# Patient Record
Sex: FEMALE | Race: OTHER | HISPANIC OR LATINO | URBAN - METROPOLITAN AREA
[De-identification: names, ages, dates, MRNs, and addresses within clinical notes are randomized per-mention and may not be internally consistent; named-entity substitution may affect disease eponyms.]

---

## 2019-01-03 VITALS
OXYGEN SATURATION: 95 % | RESPIRATION RATE: 16 BRPM | TEMPERATURE: 97 F | SYSTOLIC BLOOD PRESSURE: 150 MMHG | HEART RATE: 89 BPM | WEIGHT: 143.08 LBS | DIASTOLIC BLOOD PRESSURE: 74 MMHG | HEIGHT: 60 IN

## 2019-01-03 NOTE — H&P ADULT - ASSESSMENT
73 y.o English/Belarusian speaking Female former heavy smoker with PMHx of HTN, Hyperlipidemia, IDDM, Asthma, Anxiety/depression,  non-obstructive CAD s/p diagnostic cardiac cath @ [facility] on 07/13/15, which revealed 30% mLAD, LCx w/ mild luminal irregularities, 50% prox Ramus, 30% pRPDA, LVEF of 60%, medical management, who now presents to [facility] for recommended cardiac catheterization with possible intervention in the setting of the pts risk factors, CCS class III anginal symptoms, known CAD and abnormal stress test.    ASA 3, Mallampati 3    of note: the pts last home dose of ASA was yesterday and denies taking Plavix, so will load the pt with ASA 81mg, Plavix 600mg and start on NS @ 75cc/hour.  K, AST and ALT hemolyzed, so re-drawing K and LFTs.    Risks & benefits of procedure and alternative therapy have been explained to the patient including but not limited to: allergic reaction, bleeding w/possible need for blood transfusion, infection, renal and vascular compromise, limb damage, arrhythmia, stroke, vessel dissection/perforation, Myocardial infarction, emergent CABG. Informed consent obtained and in chart. 73 y.o English/Indonesian speaking Female former heavy smoker with PMHx of HTN, Hyperlipidemia, IDDM, Asthma, Anxiety/depression,  non-obstructive CAD s/p diagnostic cardiac cath @ [facility] on 07/13/15, which revealed 30% mLAD, LCx w/ mild luminal irregularities, 50% prox Ramus, 30% pRPDA, LVEF of 60%, medical management, who now presents to [facility] for recommended cardiac catheterization with possible intervention in the setting of the pts risk factors, CCS class III anginal symptoms, known CAD and abnormal stress test.    ASA 3, Mallampati 3    of note: the pts last home dose of ASA was this morning and denies taking Plavix, so will load the pt with Plavix 600mg and start on NS @ 75cc/hour.  K, AST and ALT hemolyzed, so re-drawing K and LFTs and sending to lab.    Risks & benefits of procedure and alternative therapy have been explained to the patient including but not limited to: allergic reaction, bleeding w/possible need for blood transfusion, infection, renal and vascular compromise, limb damage, arrhythmia, stroke, vessel dissection/perforation, Myocardial infarction, emergent CABG. Informed consent obtained and in chart.

## 2019-01-03 NOTE — H&P ADULT - NSHPLABSRESULTS_GEN_ALL_CORE
13.6   6.0   )-----------( 178      ( 07 Jan 2019 09:37 )             40.9       01-07    140  |  104  |  25<H>  ----------------------------<  271<H>  see note   |  24  |  0.75    Ca    10.2      07 Jan 2019 09:38    TPro  7.5  /  Alb  4.4  /  TBili  0.2  /  DBili  x   /  AST  see note  /  ALT  see note  /  AlkPhos  48  01-07      PT/INR - ( 07 Jan 2019 09:37 )   PT: 11.7 sec;   INR: 1.03          PTT - ( 07 Jan 2019 09:37 )  PTT:33.0 sec    CARDIAC MARKERS ( 07 Jan 2019 09:38 )  x     / x     / see note U/L / x     / 2.6 ng/mL        EKG: NSR 80bpm

## 2019-01-03 NOTE — H&P ADULT - HISTORY OF PRESENT ILLNESS
*** PT TO BRING IN ALL MEDS     73 y.o English/Andorran speaking Female former heavy smoker with PMHx of HTN, Hyperlipidemia, IDDM, Asthma, Anxiety/depression,  non-obstructive CAD s/p diagnostic cardiac cath @ [facility] on 07/13/15, which revealed 30% mLAD, LCx w/ mild luminal irregularities, 50% prox Ramus, 30% pRPDA, LVEF of 60%.  Pt was recommended for medical therapy.  She recently presented to her cardiologist Dr. Figueroa c/o progressively worsening dyspnea on exertion when walking 2 or more city blocks over the past few months, resolving within minutes of rest  He denies any CP, recent PND/orthopnea, LE edema, palpitations, dizziness, or syncope. Echo done on 12/07/18 revealed no segmental wall motion abnormalities, no valvular heart disease, LVEF of 65%.    Subsequently,  pt underwent a Nuclear Stress test on 12/07/18, which revealed mild anteroseptal and lateral wall ischemia, LVEF >75%.      In light of pt's risk factors, Known CAD, above CCS Anginal Class 3 Equivalent symptoms, and an abnormal Stress test, pt is now referred to [facility] for recommended Cardiac Cath with possible intervention if clinically indicated to r/o underlying CAD, 73 y.o English/Estonian speaking Female former heavy smoker with PMHx of HTN, Hyperlipidemia, IDDM, Asthma, Anxiety/depression,  non-obstructive CAD s/p diagnostic cardiac cath @ [facility] on 07/13/15, which revealed 30% mLAD, LCx w/ mild luminal irregularities, 50% prox Ramus, 30% pRPDA, LVEF of 60%.  Pt was recommended for medical therapy.  She recently presented to her cardiologist Dr. Figueroa c/o progressively worsening dyspnea on exertion when walking 2 or more city blocks over the past few months, resolving within minutes of rest  He denies any CP, recent PND/orthopnea, LE edema, palpitations, dizziness, or syncope. Echo done on 12/07/18 revealed no segmental wall motion abnormalities, no valvular heart disease, LVEF of 65%.    Subsequently,  pt underwent a Nuclear Stress test on 12/07/18, which revealed mild anteroseptal and lateral wall ischemia, LVEF >75%.      In light of pt's risk factors, Known CAD, above CCS Anginal Class 3 Equivalent symptoms, and an abnormal Stress test, pt is now referred to [facility] for recommended Cardiac Cath with possible intervention if clinically indicated to r/o underlying CAD,

## 2019-01-03 NOTE — H&P ADULT - PMH
Anxiety    Asthma    Coronary artery disease    Depression    Diabetes mellitus    Hyperlipidemia    Hypertension

## 2019-01-07 ENCOUNTER — INPATIENT (INPATIENT)
Facility: HOSPITAL | Age: 74
LOS: 0 days | Discharge: HOME HEALTH | DRG: 247 | End: 2019-01-08
Attending: INTERNAL MEDICINE | Admitting: INTERNAL MEDICINE
Payer: MEDICARE

## 2019-01-07 LAB
ALBUMIN SERPL ELPH-MCNC: 4.3 G/DL — SIGNIFICANT CHANGE UP (ref 3.3–5)
ALBUMIN SERPL ELPH-MCNC: 4.4 G/DL — SIGNIFICANT CHANGE UP (ref 3.3–5)
ALP SERPL-CCNC: 47 U/L — SIGNIFICANT CHANGE UP (ref 40–120)
ALP SERPL-CCNC: 48 U/L — SIGNIFICANT CHANGE UP (ref 40–120)
ALT FLD-CCNC: 27 U/L — SIGNIFICANT CHANGE UP (ref 10–45)
ALT FLD-CCNC: SIGNIFICANT CHANGE UP U/L (ref 10–45)
ANION GAP SERPL CALC-SCNC: 12 MMOL/L — SIGNIFICANT CHANGE UP (ref 5–17)
APTT BLD: 33 SEC — SIGNIFICANT CHANGE UP (ref 27.5–36.3)
AST SERPL-CCNC: 25 U/L — SIGNIFICANT CHANGE UP (ref 10–40)
AST SERPL-CCNC: SIGNIFICANT CHANGE UP U/L (ref 10–40)
BASOPHILS NFR BLD AUTO: 0.5 % — SIGNIFICANT CHANGE UP (ref 0–2)
BILIRUB DIRECT SERPL-MCNC: <0.2 MG/DL — SIGNIFICANT CHANGE UP (ref 0–0.2)
BILIRUB INDIRECT FLD-MCNC: >0 MG/DL — LOW (ref 0.2–1)
BILIRUB SERPL-MCNC: 0.2 MG/DL — SIGNIFICANT CHANGE UP (ref 0.2–1.2)
BILIRUB SERPL-MCNC: 0.2 MG/DL — SIGNIFICANT CHANGE UP (ref 0.2–1.2)
BUN SERPL-MCNC: 25 MG/DL — HIGH (ref 7–23)
CALCIUM SERPL-MCNC: 10.2 MG/DL — SIGNIFICANT CHANGE UP (ref 8.4–10.5)
CHLORIDE SERPL-SCNC: 104 MMOL/L — SIGNIFICANT CHANGE UP (ref 96–108)
CHOLEST SERPL-MCNC: 148 MG/DL — SIGNIFICANT CHANGE UP (ref 10–199)
CK MB CFR SERPL CALC: 2.6 NG/ML — SIGNIFICANT CHANGE UP (ref 0–6.7)
CK SERPL-CCNC: SIGNIFICANT CHANGE UP U/L (ref 25–170)
CO2 SERPL-SCNC: 24 MMOL/L — SIGNIFICANT CHANGE UP (ref 22–31)
CREAT SERPL-MCNC: 0.75 MG/DL — SIGNIFICANT CHANGE UP (ref 0.5–1.3)
EOSINOPHIL NFR BLD AUTO: 1.7 % — SIGNIFICANT CHANGE UP (ref 0–6)
GLUCOSE BLDC GLUCOMTR-MCNC: 114 MG/DL — HIGH (ref 70–99)
GLUCOSE BLDC GLUCOMTR-MCNC: 142 MG/DL — HIGH (ref 70–99)
GLUCOSE BLDC GLUCOMTR-MCNC: 76 MG/DL — SIGNIFICANT CHANGE UP (ref 70–99)
GLUCOSE SERPL-MCNC: 271 MG/DL — HIGH (ref 70–99)
HBA1C BLD-MCNC: 7.8 % — HIGH (ref 4–5.6)
HCT VFR BLD CALC: 40.9 % — SIGNIFICANT CHANGE UP (ref 34.5–45)
HDLC SERPL-MCNC: 48 MG/DL — LOW
HGB BLD-MCNC: 13.6 G/DL — SIGNIFICANT CHANGE UP (ref 11.5–15.5)
INR BLD: 1.03 — SIGNIFICANT CHANGE UP (ref 0.88–1.16)
LIPID PNL WITH DIRECT LDL SERPL: 73 MG/DL — SIGNIFICANT CHANGE UP
LYMPHOCYTES # BLD AUTO: 26.4 % — SIGNIFICANT CHANGE UP (ref 13–44)
MCHC RBC-ENTMCNC: 28.4 PG — SIGNIFICANT CHANGE UP (ref 27–34)
MCHC RBC-ENTMCNC: 33.3 G/DL — SIGNIFICANT CHANGE UP (ref 32–36)
MCV RBC AUTO: 85.4 FL — SIGNIFICANT CHANGE UP (ref 80–100)
MONOCYTES NFR BLD AUTO: 4.5 % — SIGNIFICANT CHANGE UP (ref 2–14)
NEUTROPHILS NFR BLD AUTO: 66.9 % — SIGNIFICANT CHANGE UP (ref 43–77)
PLATELET # BLD AUTO: 178 K/UL — SIGNIFICANT CHANGE UP (ref 150–400)
POTASSIUM SERPL-MCNC: 4.3 MMOL/L — SIGNIFICANT CHANGE UP (ref 3.5–5.3)
POTASSIUM SERPL-MCNC: SIGNIFICANT CHANGE UP MMOL/L (ref 3.5–5.3)
POTASSIUM SERPL-SCNC: 4.3 MMOL/L — SIGNIFICANT CHANGE UP (ref 3.5–5.3)
POTASSIUM SERPL-SCNC: SIGNIFICANT CHANGE UP MMOL/L (ref 3.5–5.3)
PROT SERPL-MCNC: 7 G/DL — SIGNIFICANT CHANGE UP (ref 6–8.3)
PROT SERPL-MCNC: 7.5 G/DL — SIGNIFICANT CHANGE UP (ref 6–8.3)
PROTHROM AB SERPL-ACNC: 11.7 SEC — SIGNIFICANT CHANGE UP (ref 10–12.9)
RBC # BLD: 4.79 M/UL — SIGNIFICANT CHANGE UP (ref 3.8–5.2)
RBC # FLD: 13.7 % — SIGNIFICANT CHANGE UP (ref 10.3–16.9)
SODIUM SERPL-SCNC: 140 MMOL/L — SIGNIFICANT CHANGE UP (ref 135–145)
TOTAL CHOLESTEROL/HDL RATIO MEASUREMENT: 3.1 RATIO — LOW (ref 3.3–7.1)
TRIGL SERPL-MCNC: 137 MG/DL — SIGNIFICANT CHANGE UP (ref 10–149)
WBC # BLD: 6 K/UL — SIGNIFICANT CHANGE UP (ref 3.8–10.5)
WBC # FLD AUTO: 6 K/UL — SIGNIFICANT CHANGE UP (ref 3.8–10.5)

## 2019-01-07 PROCEDURE — 99222 1ST HOSP IP/OBS MODERATE 55: CPT

## 2019-01-07 PROCEDURE — 92928 PRQ TCAT PLMT NTRAC ST 1 LES: CPT | Mod: LD

## 2019-01-07 PROCEDURE — 93010 ELECTROCARDIOGRAM REPORT: CPT | Mod: 76

## 2019-01-07 PROCEDURE — 93458 L HRT ARTERY/VENTRICLE ANGIO: CPT | Mod: 26,XU

## 2019-01-07 RX ORDER — INSULIN GLARGINE 100 [IU]/ML
28 INJECTION, SOLUTION SUBCUTANEOUS AT BEDTIME
Qty: 0 | Refills: 0 | Status: DISCONTINUED | OUTPATIENT
Start: 2019-01-07 | End: 2019-01-08

## 2019-01-07 RX ORDER — ARIPIPRAZOLE 15 MG/1
10 TABLET ORAL DAILY
Qty: 0 | Refills: 0 | Status: DISCONTINUED | OUTPATIENT
Start: 2019-01-07 | End: 2019-01-08

## 2019-01-07 RX ORDER — MECLIZINE HCL 12.5 MG
12.5 TABLET ORAL DAILY
Qty: 0 | Refills: 0 | Status: DISCONTINUED | OUTPATIENT
Start: 2019-01-07 | End: 2019-01-08

## 2019-01-07 RX ORDER — INSULIN LISPRO 100/ML
VIAL (ML) SUBCUTANEOUS
Qty: 0 | Refills: 0 | Status: DISCONTINUED | OUTPATIENT
Start: 2019-01-07 | End: 2019-01-08

## 2019-01-07 RX ORDER — SODIUM CHLORIDE 9 MG/ML
500 INJECTION INTRAMUSCULAR; INTRAVENOUS; SUBCUTANEOUS
Qty: 0 | Refills: 0 | Status: DISCONTINUED | OUTPATIENT
Start: 2019-01-07 | End: 2019-01-07

## 2019-01-07 RX ORDER — SODIUM CHLORIDE 9 MG/ML
1000 INJECTION, SOLUTION INTRAVENOUS
Qty: 0 | Refills: 0 | Status: DISCONTINUED | OUTPATIENT
Start: 2019-01-07 | End: 2019-01-07

## 2019-01-07 RX ORDER — ALBUTEROL 90 UG/1
2 AEROSOL, METERED ORAL EVERY 6 HOURS
Qty: 0 | Refills: 0 | Status: DISCONTINUED | OUTPATIENT
Start: 2019-01-07 | End: 2019-01-08

## 2019-01-07 RX ORDER — DEXTROSE 50 % IN WATER 50 %
15 SYRINGE (ML) INTRAVENOUS ONCE
Qty: 0 | Refills: 0 | Status: DISCONTINUED | OUTPATIENT
Start: 2019-01-07 | End: 2019-01-07

## 2019-01-07 RX ORDER — INSULIN LISPRO 100/ML
VIAL (ML) SUBCUTANEOUS ONCE
Qty: 0 | Refills: 0 | Status: DISCONTINUED | OUTPATIENT
Start: 2019-01-07 | End: 2019-01-07

## 2019-01-07 RX ORDER — DEXTROSE 50 % IN WATER 50 %
25 SYRINGE (ML) INTRAVENOUS ONCE
Qty: 0 | Refills: 0 | Status: DISCONTINUED | OUTPATIENT
Start: 2019-01-07 | End: 2019-01-07

## 2019-01-07 RX ORDER — SODIUM CHLORIDE 9 MG/ML
1000 INJECTION, SOLUTION INTRAVENOUS
Qty: 0 | Refills: 0 | Status: DISCONTINUED | OUTPATIENT
Start: 2019-01-07 | End: 2019-01-08

## 2019-01-07 RX ORDER — DEXTROSE 50 % IN WATER 50 %
12.5 SYRINGE (ML) INTRAVENOUS ONCE
Qty: 0 | Refills: 0 | Status: DISCONTINUED | OUTPATIENT
Start: 2019-01-07 | End: 2019-01-08

## 2019-01-07 RX ORDER — CLOPIDOGREL BISULFATE 75 MG/1
75 TABLET, FILM COATED ORAL DAILY
Qty: 0 | Refills: 0 | Status: DISCONTINUED | OUTPATIENT
Start: 2019-01-08 | End: 2019-01-08

## 2019-01-07 RX ORDER — SODIUM CHLORIDE 9 MG/ML
500 INJECTION INTRAMUSCULAR; INTRAVENOUS; SUBCUTANEOUS
Qty: 0 | Refills: 0 | Status: DISCONTINUED | OUTPATIENT
Start: 2019-01-07 | End: 2019-01-08

## 2019-01-07 RX ORDER — DEXTROSE 50 % IN WATER 50 %
25 SYRINGE (ML) INTRAVENOUS ONCE
Qty: 0 | Refills: 0 | Status: DISCONTINUED | OUTPATIENT
Start: 2019-01-07 | End: 2019-01-08

## 2019-01-07 RX ORDER — DEXTROSE 50 % IN WATER 50 %
15 SYRINGE (ML) INTRAVENOUS ONCE
Qty: 0 | Refills: 0 | Status: DISCONTINUED | OUTPATIENT
Start: 2019-01-07 | End: 2019-01-08

## 2019-01-07 RX ORDER — BUDESONIDE AND FORMOTEROL FUMARATE DIHYDRATE 160; 4.5 UG/1; UG/1
2 AEROSOL RESPIRATORY (INHALATION)
Qty: 0 | Refills: 0 | Status: DISCONTINUED | OUTPATIENT
Start: 2019-01-07 | End: 2019-01-08

## 2019-01-07 RX ORDER — GABAPENTIN 400 MG/1
300 CAPSULE ORAL THREE TIMES A DAY
Qty: 0 | Refills: 0 | Status: DISCONTINUED | OUTPATIENT
Start: 2019-01-07 | End: 2019-01-08

## 2019-01-07 RX ORDER — DEXTROSE 50 % IN WATER 50 %
12.5 SYRINGE (ML) INTRAVENOUS ONCE
Qty: 0 | Refills: 0 | Status: DISCONTINUED | OUTPATIENT
Start: 2019-01-07 | End: 2019-01-07

## 2019-01-07 RX ORDER — CHLORHEXIDINE GLUCONATE 213 G/1000ML
1 SOLUTION TOPICAL ONCE
Qty: 0 | Refills: 0 | Status: DISCONTINUED | OUTPATIENT
Start: 2019-01-07 | End: 2019-01-07

## 2019-01-07 RX ORDER — ATORVASTATIN CALCIUM 80 MG/1
80 TABLET, FILM COATED ORAL AT BEDTIME
Qty: 0 | Refills: 0 | Status: DISCONTINUED | OUTPATIENT
Start: 2019-01-07 | End: 2019-01-08

## 2019-01-07 RX ORDER — CLOPIDOGREL BISULFATE 75 MG/1
600 TABLET, FILM COATED ORAL ONCE
Qty: 0 | Refills: 0 | Status: COMPLETED | OUTPATIENT
Start: 2019-01-07 | End: 2019-01-07

## 2019-01-07 RX ORDER — GLUCAGON INJECTION, SOLUTION 0.5 MG/.1ML
1 INJECTION, SOLUTION SUBCUTANEOUS ONCE
Qty: 0 | Refills: 0 | Status: DISCONTINUED | OUTPATIENT
Start: 2019-01-07 | End: 2019-01-07

## 2019-01-07 RX ORDER — NIFEDIPINE 30 MG
30 TABLET, EXTENDED RELEASE 24 HR ORAL ONCE
Qty: 0 | Refills: 0 | Status: COMPLETED | OUTPATIENT
Start: 2019-01-07 | End: 2019-01-07

## 2019-01-07 RX ORDER — ASPIRIN/CALCIUM CARB/MAGNESIUM 324 MG
81 TABLET ORAL DAILY
Qty: 0 | Refills: 0 | Status: DISCONTINUED | OUTPATIENT
Start: 2019-01-08 | End: 2019-01-08

## 2019-01-07 RX ORDER — DULOXETINE HYDROCHLORIDE 30 MG/1
90 CAPSULE, DELAYED RELEASE ORAL DAILY
Qty: 0 | Refills: 0 | Status: DISCONTINUED | OUTPATIENT
Start: 2019-01-07 | End: 2019-01-08

## 2019-01-07 RX ORDER — MONTELUKAST 4 MG/1
10 TABLET, CHEWABLE ORAL DAILY
Qty: 0 | Refills: 0 | Status: DISCONTINUED | OUTPATIENT
Start: 2019-01-07 | End: 2019-01-08

## 2019-01-07 RX ORDER — GLUCAGON INJECTION, SOLUTION 0.5 MG/.1ML
1 INJECTION, SOLUTION SUBCUTANEOUS ONCE
Qty: 0 | Refills: 0 | Status: DISCONTINUED | OUTPATIENT
Start: 2019-01-07 | End: 2019-01-08

## 2019-01-07 RX ORDER — PANTOPRAZOLE SODIUM 20 MG/1
40 TABLET, DELAYED RELEASE ORAL
Qty: 0 | Refills: 0 | Status: DISCONTINUED | OUTPATIENT
Start: 2019-01-07 | End: 2019-01-08

## 2019-01-07 RX ORDER — FENOFIBRATE,MICRONIZED 130 MG
145 CAPSULE ORAL DAILY
Qty: 0 | Refills: 0 | Status: DISCONTINUED | OUTPATIENT
Start: 2019-01-07 | End: 2019-01-08

## 2019-01-07 RX ADMIN — Medication 30 MILLIGRAM(S): at 18:14

## 2019-01-07 RX ADMIN — ATORVASTATIN CALCIUM 80 MILLIGRAM(S): 80 TABLET, FILM COATED ORAL at 21:08

## 2019-01-07 RX ADMIN — Medication 12.5 MILLIGRAM(S): at 18:14

## 2019-01-07 RX ADMIN — GABAPENTIN 300 MILLIGRAM(S): 400 CAPSULE ORAL at 21:08

## 2019-01-07 RX ADMIN — ARIPIPRAZOLE 10 MILLIGRAM(S): 15 TABLET ORAL at 18:14

## 2019-01-07 RX ADMIN — SODIUM CHLORIDE 75 MILLILITER(S): 9 INJECTION INTRAMUSCULAR; INTRAVENOUS; SUBCUTANEOUS at 17:20

## 2019-01-07 RX ADMIN — INSULIN GLARGINE 28 UNIT(S): 100 INJECTION, SOLUTION SUBCUTANEOUS at 23:04

## 2019-01-07 RX ADMIN — DULOXETINE HYDROCHLORIDE 90 MILLIGRAM(S): 30 CAPSULE, DELAYED RELEASE ORAL at 18:14

## 2019-01-07 RX ADMIN — BUDESONIDE AND FORMOTEROL FUMARATE DIHYDRATE 2 PUFF(S): 160; 4.5 AEROSOL RESPIRATORY (INHALATION) at 18:14

## 2019-01-07 RX ADMIN — MONTELUKAST 10 MILLIGRAM(S): 4 TABLET, CHEWABLE ORAL at 18:14

## 2019-01-07 RX ADMIN — Medication 145 MILLIGRAM(S): at 18:14

## 2019-01-07 RX ADMIN — SODIUM CHLORIDE 75 MILLILITER(S): 9 INJECTION INTRAMUSCULAR; INTRAVENOUS; SUBCUTANEOUS at 10:41

## 2019-01-07 RX ADMIN — Medication 1 MILLIGRAM(S): at 18:14

## 2019-01-07 RX ADMIN — CLOPIDOGREL BISULFATE 600 MILLIGRAM(S): 75 TABLET, FILM COATED ORAL at 10:41

## 2019-01-08 ENCOUNTER — INBOUND DOCUMENT (OUTPATIENT)
Age: 74
End: 2019-01-08

## 2019-01-08 ENCOUNTER — TRANSCRIPTION ENCOUNTER (OUTPATIENT)
Age: 74
End: 2019-01-08

## 2019-01-08 VITALS
HEART RATE: 99 BPM | OXYGEN SATURATION: 96 % | DIASTOLIC BLOOD PRESSURE: 79 MMHG | RESPIRATION RATE: 16 BRPM | SYSTOLIC BLOOD PRESSURE: 153 MMHG

## 2019-01-08 LAB
ANION GAP SERPL CALC-SCNC: 16 MMOL/L — SIGNIFICANT CHANGE UP (ref 5–17)
BASOPHILS NFR BLD AUTO: 0.3 % — SIGNIFICANT CHANGE UP (ref 0–2)
BUN SERPL-MCNC: 22 MG/DL — SIGNIFICANT CHANGE UP (ref 7–23)
CALCIUM SERPL-MCNC: 9.9 MG/DL — SIGNIFICANT CHANGE UP (ref 8.4–10.5)
CHLORIDE SERPL-SCNC: 100 MMOL/L — SIGNIFICANT CHANGE UP (ref 96–108)
CO2 SERPL-SCNC: 25 MMOL/L — SIGNIFICANT CHANGE UP (ref 22–31)
CREAT SERPL-MCNC: 0.77 MG/DL — SIGNIFICANT CHANGE UP (ref 0.5–1.3)
EOSINOPHIL NFR BLD AUTO: 1.3 % — SIGNIFICANT CHANGE UP (ref 0–6)
GLUCOSE BLDC GLUCOMTR-MCNC: 148 MG/DL — HIGH (ref 70–99)
GLUCOSE BLDC GLUCOMTR-MCNC: 162 MG/DL — HIGH (ref 70–99)
GLUCOSE BLDC GLUCOMTR-MCNC: 239 MG/DL — HIGH (ref 70–99)
GLUCOSE SERPL-MCNC: 151 MG/DL — HIGH (ref 70–99)
HBA1C BLD-MCNC: 7.6 % — HIGH (ref 4–5.6)
HCT VFR BLD CALC: 40.6 % — SIGNIFICANT CHANGE UP (ref 34.5–45)
HGB BLD-MCNC: 13.2 G/DL — SIGNIFICANT CHANGE UP (ref 11.5–15.5)
LYMPHOCYTES # BLD AUTO: 23 % — SIGNIFICANT CHANGE UP (ref 13–44)
MAGNESIUM SERPL-MCNC: 2 MG/DL — SIGNIFICANT CHANGE UP (ref 1.6–2.6)
MCHC RBC-ENTMCNC: 28 PG — SIGNIFICANT CHANGE UP (ref 27–34)
MCHC RBC-ENTMCNC: 32.5 G/DL — SIGNIFICANT CHANGE UP (ref 32–36)
MCV RBC AUTO: 86 FL — SIGNIFICANT CHANGE UP (ref 80–100)
MONOCYTES NFR BLD AUTO: 8.8 % — SIGNIFICANT CHANGE UP (ref 2–14)
NEUTROPHILS NFR BLD AUTO: 66.6 % — SIGNIFICANT CHANGE UP (ref 43–77)
NT-PROBNP SERPL-SCNC: 74 PG/ML — SIGNIFICANT CHANGE UP (ref 0–300)
PLATELET # BLD AUTO: 182 K/UL — SIGNIFICANT CHANGE UP (ref 150–400)
POTASSIUM SERPL-MCNC: 4.1 MMOL/L — SIGNIFICANT CHANGE UP (ref 3.5–5.3)
POTASSIUM SERPL-SCNC: 4.1 MMOL/L — SIGNIFICANT CHANGE UP (ref 3.5–5.3)
RBC # BLD: 4.72 M/UL — SIGNIFICANT CHANGE UP (ref 3.8–5.2)
RBC # FLD: 13.9 % — SIGNIFICANT CHANGE UP (ref 10.3–16.9)
SODIUM SERPL-SCNC: 141 MMOL/L — SIGNIFICANT CHANGE UP (ref 135–145)
WBC # BLD: 6.9 K/UL — SIGNIFICANT CHANGE UP (ref 3.8–10.5)
WBC # FLD AUTO: 6.9 K/UL — SIGNIFICANT CHANGE UP (ref 3.8–10.5)

## 2019-01-08 PROCEDURE — 84132 ASSAY OF SERUM POTASSIUM: CPT

## 2019-01-08 PROCEDURE — 36415 COLL VENOUS BLD VENIPUNCTURE: CPT

## 2019-01-08 PROCEDURE — 80053 COMPREHEN METABOLIC PANEL: CPT

## 2019-01-08 PROCEDURE — 80048 BASIC METABOLIC PNL TOTAL CA: CPT

## 2019-01-08 PROCEDURE — 83880 ASSAY OF NATRIURETIC PEPTIDE: CPT

## 2019-01-08 PROCEDURE — 80061 LIPID PANEL: CPT

## 2019-01-08 PROCEDURE — 99238 HOSP IP/OBS DSCHRG MGMT 30/<: CPT

## 2019-01-08 PROCEDURE — 71045 X-RAY EXAM CHEST 1 VIEW: CPT

## 2019-01-08 PROCEDURE — 99231 SBSQ HOSP IP/OBS SF/LOW 25: CPT | Mod: 25

## 2019-01-08 PROCEDURE — 85610 PROTHROMBIN TIME: CPT

## 2019-01-08 PROCEDURE — C1874: CPT

## 2019-01-08 PROCEDURE — C1769: CPT

## 2019-01-08 PROCEDURE — 85025 COMPLETE CBC W/AUTO DIFF WBC: CPT

## 2019-01-08 PROCEDURE — 85730 THROMBOPLASTIN TIME PARTIAL: CPT

## 2019-01-08 PROCEDURE — 94640 AIRWAY INHALATION TREATMENT: CPT

## 2019-01-08 PROCEDURE — 82553 CREATINE MB FRACTION: CPT

## 2019-01-08 PROCEDURE — C1887: CPT

## 2019-01-08 PROCEDURE — 93005 ELECTROCARDIOGRAM TRACING: CPT

## 2019-01-08 PROCEDURE — 83735 ASSAY OF MAGNESIUM: CPT

## 2019-01-08 PROCEDURE — 82550 ASSAY OF CK (CPK): CPT

## 2019-01-08 PROCEDURE — C1725: CPT

## 2019-01-08 PROCEDURE — 71045 X-RAY EXAM CHEST 1 VIEW: CPT | Mod: 26

## 2019-01-08 PROCEDURE — 80076 HEPATIC FUNCTION PANEL: CPT

## 2019-01-08 PROCEDURE — C1894: CPT

## 2019-01-08 PROCEDURE — 82962 GLUCOSE BLOOD TEST: CPT

## 2019-01-08 PROCEDURE — 83036 HEMOGLOBIN GLYCOSYLATED A1C: CPT

## 2019-01-08 RX ORDER — GABAPENTIN 400 MG/1
1 CAPSULE ORAL
Qty: 0 | Refills: 0 | COMMUNITY

## 2019-01-08 RX ORDER — CARVEDILOL PHOSPHATE 80 MG/1
6.25 CAPSULE, EXTENDED RELEASE ORAL EVERY 12 HOURS
Qty: 0 | Refills: 0 | Status: DISCONTINUED | OUTPATIENT
Start: 2019-01-08 | End: 2019-01-08

## 2019-01-08 RX ORDER — MONTELUKAST 4 MG/1
1 TABLET, CHEWABLE ORAL
Qty: 0 | Refills: 0 | COMMUNITY

## 2019-01-08 RX ORDER — CARVEDILOL PHOSPHATE 80 MG/1
3.12 CAPSULE, EXTENDED RELEASE ORAL ONCE
Qty: 0 | Refills: 0 | Status: COMPLETED | OUTPATIENT
Start: 2019-01-08 | End: 2019-01-08

## 2019-01-08 RX ORDER — DULOXETINE HYDROCHLORIDE 30 MG/1
1 CAPSULE, DELAYED RELEASE ORAL
Qty: 0 | Refills: 0 | COMMUNITY

## 2019-01-08 RX ORDER — ASPIRIN/CALCIUM CARB/MAGNESIUM 324 MG
1 TABLET ORAL
Qty: 30 | Refills: 11 | OUTPATIENT
Start: 2019-01-08 | End: 2020-01-02

## 2019-01-08 RX ORDER — ALBUTEROL 90 UG/1
2 AEROSOL, METERED ORAL
Qty: 0 | Refills: 0 | COMMUNITY

## 2019-01-08 RX ORDER — CARVEDILOL PHOSPHATE 80 MG/1
1 CAPSULE, EXTENDED RELEASE ORAL
Qty: 60 | Refills: 0 | OUTPATIENT
Start: 2019-01-08 | End: 2019-02-06

## 2019-01-08 RX ORDER — ESOMEPRAZOLE MAGNESIUM 40 MG/1
1 CAPSULE, DELAYED RELEASE ORAL
Qty: 0 | Refills: 0 | COMMUNITY

## 2019-01-08 RX ORDER — CELECOXIB 200 MG/1
1 CAPSULE ORAL
Qty: 0 | Refills: 0 | COMMUNITY

## 2019-01-08 RX ORDER — FENOFIBRATE,MICRONIZED 130 MG
1 CAPSULE ORAL
Qty: 0 | Refills: 0 | COMMUNITY

## 2019-01-08 RX ORDER — ARIPIPRAZOLE 15 MG/1
1 TABLET ORAL
Qty: 0 | Refills: 0 | COMMUNITY

## 2019-01-08 RX ORDER — BUDESONIDE AND FORMOTEROL FUMARATE DIHYDRATE 160; 4.5 UG/1; UG/1
2 AEROSOL RESPIRATORY (INHALATION)
Qty: 0 | Refills: 0 | COMMUNITY

## 2019-01-08 RX ORDER — INSULIN DEGLUDEC 100 U/ML
36 INJECTION, SOLUTION SUBCUTANEOUS
Qty: 0 | Refills: 0 | COMMUNITY

## 2019-01-08 RX ORDER — LUBIPROSTONE 24 UG/1
1 CAPSULE, GELATIN COATED ORAL
Qty: 0 | Refills: 0 | COMMUNITY

## 2019-01-08 RX ORDER — MULTIVIT-MIN/FERROUS GLUCONATE 9 MG/15 ML
1 LIQUID (ML) ORAL
Qty: 0 | Refills: 0 | COMMUNITY

## 2019-01-08 RX ORDER — SITAGLIPTIN AND METFORMIN HYDROCHLORIDE 500; 50 MG/1; MG/1
2 TABLET, FILM COATED ORAL
Qty: 0 | Refills: 0 | COMMUNITY

## 2019-01-08 RX ORDER — INSULIN GLARGINE 100 [IU]/ML
28 INJECTION, SOLUTION SUBCUTANEOUS
Qty: 0 | Refills: 0 | COMMUNITY
Start: 2019-01-08

## 2019-01-08 RX ORDER — CARVEDILOL PHOSPHATE 80 MG/1
3.12 CAPSULE, EXTENDED RELEASE ORAL EVERY 12 HOURS
Qty: 0 | Refills: 0 | Status: DISCONTINUED | OUTPATIENT
Start: 2019-01-08 | End: 2019-01-08

## 2019-01-08 RX ORDER — ASPIRIN/CALCIUM CARB/MAGNESIUM 324 MG
1 TABLET ORAL
Qty: 0 | Refills: 0 | COMMUNITY

## 2019-01-08 RX ORDER — MECLIZINE HCL 12.5 MG
1 TABLET ORAL
Qty: 0 | Refills: 0 | COMMUNITY

## 2019-01-08 RX ORDER — LINACLOTIDE 145 UG/1
1 CAPSULE, GELATIN COATED ORAL
Qty: 0 | Refills: 0 | COMMUNITY

## 2019-01-08 RX ORDER — ROSUVASTATIN CALCIUM 5 MG/1
1 TABLET ORAL
Qty: 0 | Refills: 0 | COMMUNITY

## 2019-01-08 RX ORDER — CLOPIDOGREL BISULFATE 75 MG/1
1 TABLET, FILM COATED ORAL
Qty: 30 | Refills: 11 | OUTPATIENT
Start: 2019-01-08 | End: 2020-01-02

## 2019-01-08 RX ORDER — DULAGLUTIDE 4.5 MG/.5ML
0 INJECTION, SOLUTION SUBCUTANEOUS
Qty: 0 | Refills: 0 | COMMUNITY

## 2019-01-08 RX ADMIN — Medication 2: at 11:45

## 2019-01-08 RX ADMIN — PANTOPRAZOLE SODIUM 40 MILLIGRAM(S): 20 TABLET, DELAYED RELEASE ORAL at 05:48

## 2019-01-08 RX ADMIN — BUDESONIDE AND FORMOTEROL FUMARATE DIHYDRATE 2 PUFF(S): 160; 4.5 AEROSOL RESPIRATORY (INHALATION) at 06:00

## 2019-01-08 RX ADMIN — CARVEDILOL PHOSPHATE 3.12 MILLIGRAM(S): 80 CAPSULE, EXTENDED RELEASE ORAL at 15:59

## 2019-01-08 RX ADMIN — CARVEDILOL PHOSPHATE 3.12 MILLIGRAM(S): 80 CAPSULE, EXTENDED RELEASE ORAL at 13:45

## 2019-01-08 RX ADMIN — DULOXETINE HYDROCHLORIDE 90 MILLIGRAM(S): 30 CAPSULE, DELAYED RELEASE ORAL at 11:46

## 2019-01-08 RX ADMIN — Medication 81 MILLIGRAM(S): at 11:46

## 2019-01-08 RX ADMIN — GABAPENTIN 300 MILLIGRAM(S): 400 CAPSULE ORAL at 13:45

## 2019-01-08 RX ADMIN — Medication 1 MILLIGRAM(S): at 05:48

## 2019-01-08 RX ADMIN — CLOPIDOGREL BISULFATE 75 MILLIGRAM(S): 75 TABLET, FILM COATED ORAL at 11:46

## 2019-01-08 RX ADMIN — GABAPENTIN 300 MILLIGRAM(S): 400 CAPSULE ORAL at 05:47

## 2019-01-08 RX ADMIN — ARIPIPRAZOLE 10 MILLIGRAM(S): 15 TABLET ORAL at 11:46

## 2019-01-08 RX ADMIN — Medication 12.5 MILLIGRAM(S): at 11:46

## 2019-01-08 RX ADMIN — Medication 145 MILLIGRAM(S): at 11:46

## 2019-01-08 RX ADMIN — Medication 1: at 07:26

## 2019-01-08 RX ADMIN — MONTELUKAST 10 MILLIGRAM(S): 4 TABLET, CHEWABLE ORAL at 11:46

## 2019-01-08 NOTE — DISCHARGE NOTE ADULT - HOSPITAL COURSE
72 yo English/Ukrainian speaking Female former heavy smoker with PMHx of uncontrolled HTN (SBP in 180’s upon admission and was given Nifedipine XL 30mg PO x1), Hyperlipidemia, IDDM, Asthma, Anxiety/depression, non-obstructive CAD s/p diagnostic cardiac cath @ [facility] on 07/13/15, which revealed 30% mLAD, LCx w/ mild luminal irregularities, 50% prox Ramus, 30% pRPDA, LVEF of 60% who was referred to [facility] for recommended Cardiac Cath with possible intervention if clinically indicated to r/o underlying CAD in the setting of pt's risk factors, known CAD, CCS Anginal Class 3 Equivalent symptoms and an abnormal Stress test. Pt s/p cardiac angiogram on 1/7/19 with HERNANDO to the proximal LAD by right radial access. At the end of the procedure, patient complained of a 10/10 chest pain. SBP is elevated and pt was given Fentanyl 50mg IC and Hydralazine 10mg IV. Repeat EKG performed and was NSR, unchanged from baseline. CP has completely resolved. Systolic BP stabilized and pt was admitted as inpatient on 5URIS under Dr. Figueroa for monitoring and telemetry. Overnight, pt was a few runs of sinus tachycardia with HR in the 110’s. Pt is afebrile, O2 stat stable and currently HR is in the 90’s. In the AM, VSS, pt asymptomatic, right wrist stable, labs and EKG reviewed. As discussed with Dr. Figueroa, pt is prescribed Coreg 3.125mg BID for blood pressure and heart rate management. And as discussed with patient, she is prescribed a BP monitor for at home monitoring. Pt stable for D/C home as per Dr. Figueroa. CONTINUE ASPIRIN AND PLAVIX (CLOPIDOGREL) DAILY. Continue current listed medical regimen. See Dr. Figueroa in 1 week. See Primary Doctor in 1 week. Monitor right warm for swelling, bleeding, discharge, pain or skin discoloration if any of these findings are noted go to nearest ER, seek medical attention immediately and call [phone] if any questions. If chest pain, shortness of breath, dizziness noted call MD immediately and seek medical attention. Avoid hot tubs, swimming pools and baths for 1 week. Avoid lifting more than 3 pounds for one week, avoid exertional movements such intimacy, running, jumping, etc for 1 week. Avoid exertional movements and lifting more than 2 pounds in right arm for 1 week. Continue listed medical regimen. 72 yo English/Setswana speaking Female former heavy smoker with PMHx of uncontrolled HTN (SBP in 180’s upon admission and was given Nifedipine XL 30mg PO x1), Hyperlipidemia, IDDM, Asthma, Anxiety/depression, non-obstructive CAD s/p diagnostic cardiac cath @ [facility] on 07/13/15, which revealed 30% mLAD, LCx w/ mild luminal irregularities, 50% prox Ramus, 30% pRPDA, LVEF of 60% who was referred to [facility] for recommended Cardiac Cath with possible intervention if clinically indicated to r/o underlying CAD in the setting of pt's risk factors, known CAD, CCS Anginal Class 3 Equivalent symptoms and an abnormal Stress test. Pt s/p cardiac angiogram on 1/7/19 with HERNANDO to the proximal LAD by right radial access. At the end of the procedure, patient complained of a 10/10 chest pain. SBP is elevated and pt was given Fentanyl 50mg IC and Hydralazine 10mg IV. Repeat EKG performed and was NSR, unchanged from baseline. CP has completely resolved. Systolic BP stabilized and pt was admitted as inpatient on 5URIS under Dr. Figueroa for monitoring and telemetry. Overnight, pt was a few runs of sinus tachycardia with HR in the 110’s. Pt is afebrile, O2 stat stable and currently HR is in the 90’s. In the AM, VSS, pt asymptomatic, right wrist stable, labs and EKG reviewed. As discussed with Dr. Figueroa, pt is prescribed Coreg 6.25 mg BID for blood pressure and heart rate management, ASA 81mg daily and Plavix 75mg daily. And, as discussed with patient, she is prescribed a BP monitor for at home monitoring. Pt stable for D/C home as per Dr. Figueroa. CONTINUE ASPIRIN AND PLAVIX (CLOPIDOGREL) DAILY. Continue current listed medical regimen. See Dr. Figueroa in 1 week. See Primary Doctor in 1 week. Monitor right warm for swelling, bleeding, discharge, pain or skin discoloration if any of these findings are noted go to nearest ER, seek medical attention immediately and call [phone] if any questions. If chest pain, shortness of breath, dizziness noted call MD immediately and seek medical attention. Avoid hot tubs, swimming pools and baths for 1 week. Avoid lifting more than 3 pounds for one week, avoid exertional movements such intimacy, running, jumping, etc for 1 week. Avoid exertional movements and lifting more than 2 pounds in right arm for 1 week. Continue listed medical regimen. 74 yo English/Cambodian speaking Female former heavy smoker with PMHx of uncontrolled HTN, Hyperlipidemia, IDDM, Asthma and Anxiety/depression who presented with CCS Anginal Class 3 Equivalent symptoms and an abnormal Stress test. Pt s/p cardiac angiogram (1/7/19) with HERNANDO to the proximal LAD by right radial access. LVEF 55% and EDP 15. Post procedure, pt had chest pain, elevated SBP and was given IV Fentanyl and IV Hydralazine. Repeat EKG was with non-ischemic changes. CP has completely resolved. Pt was admitted as inpatient on 5URIS under Dr. Figueroa for monitoring and telemetry. Overnight, pt had a few runs of sinus tachycardia with HR in the 110’s. Pt is afebrile, O2 stat stable and currently HR is in the 90’s. In the AM, VS remained stable, pt asymptomatic, right wrist stable, labs and EKG reviewed. As discussed with Dr. Figueroa, pt is prescribed Coreg 6.25 mg BID for blood pressure and heart rate management, ASA 81mg daily and Plavix 75mg. And as discussed with patient, she is prescribed a BP monitor for home monitoring. Pt stable for D/C home as per Dr. Figueroa. 74 yo English/Slovenian speaking Female former heavy smoker with PMHx of uncontrolled HTN, Hyperlipidemia, IDDM, Asthma and Anxiety/depression who presented with CCS Anginal Class 3 Equivalent symptoms and an abnormal Stress test. Pt s/p cardiac angiogram (1/7/19) with HERNANDO to the proximal LAD by right radial access. LVEF 55% and EDP 15. Post procedure, pt had chest pain, elevated SBP and was given IV Fentanyl and IV Hydralazine. Repeat EKG was with non-ischemic changes. CP has completely resolved. Pt was admitted as inpatient on 5URIS under Dr. Figueroa for monitoring and telemetry. Overnight, pt had a few runs of sinus tachycardia with HR in the 110’s. Pt is afebrile, asymptomatic, denies infectious symptoms, O2 sat stable 98% on RA and currently HR is in the 90’s. This AM, VS remained stable with current 's-140's/70's. Pt is asymptomatic, right wrist stable, labs and EKG reviewed. As discussed with Dr. Figueroa, start Coreg 6.25 mg BID and continue ASA 81mg daily, Plavix 75mg daily and Crestor 20mg nightly. Prescribed a BP monitor for home monitoring. Pt stable for D/C home as per Dr. Figueroa. See Dr. Figueroa in 1 week    Pt seen and examined bedside.  Patient denies C/P, SOB, N/V, dizziness, palpitations, and diaphoresis.  Pt denies fever/chills, dysuria, abdominal pain, diarrhea, and cough    T(C): 36.7 (01-08-19 @ 13:29), Max: 36.7 (01-08-19 @ 05:03)  HR: 90s (01-08-19 @ 13:45) (72 - 106)  BP: 153/79 (01-08-19 @ 13:45) (130/66 - 183/79)  RR: 16 (01-08-19 @ 13:45) (16 - 18)  SpO2: 96% (01-08-19 @ 13:45) (95% - 99% )                                 Appearance: Normal	  HEENT:   Normal oral mucosa, PERRL, EOMI	  Neck: Supple, - JVD; Carotid Bruit   Cardiovascular: Normal S1 S2, No JVD, No murmurs,   Respiratory: Lungs clear to auscultation/Decreased Breath Sounds/No Rales, Rhonchi, Wheezing	  Gastrointestinal:  Soft, Non-tender, + BS	  Skin: No rashes, No ecchymoses, No cyanosis  Extremities: Normal range of motion, No clubbing, cyanosis or edema  R Wrist: Soft, no hematoma, no bleeding, radial pulse 2+  Neurologic:  A & O x 3, Mood & affect appropriate                        13.2   6.9   )-----------( 182      ( 08 Jan 2019 06:02 )             40.6     01-08  141  |  100  |  22  ----------------------------<  151<H>  4.1   |  25  |  0.77    Ca    9.9      08 Jan 2019 06:02  Mg     2.0     01-08    TPro  7.0  /  Alb  4.3  /  TBili  0.2  /  DBili  <0.2  /  AST  25  /  ALT  27  /  AlkPhos  47  01-07 74 yo English/Mohawk speaking Female former heavy smoker with PMHx of uncontrolled HTN, Hyperlipidemia, IDDM, Asthma and Anxiety/depression who presented with CCS Anginal Class 3 Equivalent symptoms and an abnormal Stress test. Pt s/p cardiac angiogram (1/7/19) with HERNANDO to the proximal LAD by right radial access. LVEF 55% and EDP 15. Post procedure, pt had chest pain, elevated SBP and was given IV Fentanyl and IV Hydralazine. Repeat EKG was with non-ischemic changes. CP has completely resolved. Pt was admitted as inpatient on 5URIS under Dr. Figueroa for monitoring and telemetry. Overnight, pt had a few runs of sinus tachycardia with HR in the 110’s. Pt is afebrile, asymptomatic, denies infectious symptoms, O2 sat stable 98% on RA and currently HR is in the 90’s. This AM, VS remained stable with current 's-140's/70's. Pt is asymptomatic, right wrist stable, labs and EKG reviewed. As discussed with Dr. Figueroa, start Coreg 6.25 mg BID and continue ASA 81mg daily, Plavix 75mg daily and Crestor 20mg nightly. Prescribed a BP monitor for home monitoring. Pt stable for D/C home as per Dr. iFgueroa. See Dr. Figueroa in 1 week    Pt seen and examined bedside.  Patient denies C/P, SOB, N/V, dizziness, palpitations, and diaphoresis.  Pt denies fever/chills, dysuria, abdominal pain, diarrhea, and cough    T(C): 36.7 (01-08-19 @ 13:29), Max: 36.7 (01-08-19 @ 05:03)  HR: 90s (01-08-19 @ 13:45) (72 - 106)  BP: 153/79 (01-08-19 @ 13:45) (130/66 - 183/79)  RR: 16 (01-08-19 @ 13:45) (16 - 18)  SpO2: 96% (01-08-19 @ 13:45) (95% - 99% )                                 Appearance: Normal	  HEENT:   Normal oral mucosa, PERRL, EOMI	  Neck: Supple, - JVD; - Carotid Bruit   Cardiovascular: Normal S1 S2, No JVD, No murmurs,   Respiratory: Lungs clear to auscultation, No Rales, Rhonchi, Wheezing	  Gastrointestinal:  Soft, Non-tender, + BS	  Skin: No rashes, No ecchymoses, No cyanosis  Extremities: Normal range of motion, No clubbing, cyanosis or edema  R Wrist: Soft, no hematoma, no bleeding, radial pulse 2+  Neurologic:  A & O x 3, Mood & affect appropriate                        13.2   6.9   )-----------( 182      ( 08 Jan 2019 06:02 )             40.6     01-08  141  |  100  |  22  ----------------------------<  151<H>  4.1   |  25  |  0.77    Ca    9.9      08 Jan 2019 06:02  Mg     2.0     01-08    TPro  7.0  /  Alb  4.3  /  TBili  0.2  /  DBili  <0.2  /  AST  25  /  ALT  27  /  AlkPhos  47  01-07

## 2019-01-08 NOTE — DISCHARGE NOTE ADULT - CARE PROVIDER_API CALL
Citlaly Figueroa), Internal Medicine  [address] [id number]  Phone: [phone]  Fax: [phone]

## 2019-01-08 NOTE — DISCHARGE NOTE ADULT - PATIENT PORTAL LINK FT
You can access the [facility] Portal, offered by [facility], by registering with the following website: [email or web address]

## 2019-01-08 NOTE — DISCHARGE NOTE ADULT - NS AS ACTIVITY OBS
Showering allowed/No Heavy lifting/straining/Do not drive or operate machinery/Walking-Outdoors allowed/Walking-Indoors allowed No Heavy lifting/straining/Walking-Outdoors allowed/Showering allowed/Avoid hot tubs, swimming pools and baths for 1 week. Avoid lifting more than 3 pounds for one week, avoid exertional movements such intimacy, running, jumping, etc for 1 week. Avoid exertional movements and lifting more than 2 pounds in right arm for 1 week./Walking-Indoors allowed

## 2019-01-08 NOTE — DISCHARGE NOTE ADULT - PLAN OF CARE
You had a cardiac catheterization. You have a diagnosis of coronary artery disease and  received a stent to your coronary artery.  You have been started on Aspirin 81mg daily and Plavix (Clopidogrel) 75mg daily. You MUST continue taking the daily Aspirin and Plavix to ensure your stent does not close. DO NOT STOP THESE MEDICATIONS FOR ANY REASON UNLESS OTHERWISE INDICATED BY YOUR CARDIOLOGIST BECAUSE THIS WILL PUT YOU AT RISK FOR A HEART ATTACK. You should refrain from strenuous activity and heavy lifting for 1 week. Please make a follow up appointment with your cardiologist within 1-2 weeks of your discharge. All of your prescriptions have been sent electronically to your pharmacy. You have Diabetes Mellitus Type II. You have high cholesterol. You have high blood pressure. You have a diagnosis of diabetes and should continue all of your diabetic medications as prescribed. Please do not take your Janumet XR 50mg-500mg for 2 days to prevent interaction with the contrast you received. Please continue your daily statin, Rosuvastatin 20mg nightly, to ensure your cardiac stent remains open. You have a history of elevated blood pressure and you should continue your blood pressure medications as prescribed. Please continue your daily statin to ensure your cardiac stent remains open. You have a diagnosis of coronary artery disease and  received a stent to your coronary artery.  You have been started on Aspirin 81mg daily and Plavix (Clopidogrel) 75mg daily. You MUST continue taking the daily Aspirin and Plavix to ensure your stent does not close. DO NOT STOP THESE MEDICATIONS FOR ANY REASON UNLESS OTHERWISE INDICATED BY YOUR CARDIOLOGIST BECAUSE THIS WILL PUT YOU AT RISK FOR A HEART ATTACK. You should refrain from strenuous activity and heavy lifting for 1 week. Please make a follow up appointment with your cardiologist (Dr. Figueroa) within 1 week of your discharge. All of your prescriptions have been sent electronically to your pharmacy. Please do not take your Janumet XR 50mg-500mg for 2 days to prevent interaction with the contrast you received. RESUME JANUMET ON THURSDAY, 1/10/19. You can continue all other of your diabetic medications as prescribed. Please continue your daily Crestor (Rosuvastatin) to ensure your cardiac stent remains open. You have a history of elevated blood pressure and you started on Coreg (Carvedilol).

## 2019-01-08 NOTE — DISCHARGE NOTE ADULT - CARE PLAN
Principal Discharge DX:	Coronary artery disease  Goal:	You had a cardiac catheterization.  Assessment and plan of treatment:	You have a diagnosis of coronary artery disease and  received a stent to your coronary artery.  You have been started on Aspirin 81mg daily and Plavix (Clopidogrel) 75mg daily. You MUST continue taking the daily Aspirin and Plavix to ensure your stent does not close. DO NOT STOP THESE MEDICATIONS FOR ANY REASON UNLESS OTHERWISE INDICATED BY YOUR CARDIOLOGIST BECAUSE THIS WILL PUT YOU AT RISK FOR A HEART ATTACK. You should refrain from strenuous activity and heavy lifting for 1 week. Please make a follow up appointment with your cardiologist within 1-2 weeks of your discharge. All of your prescriptions have been sent electronically to your pharmacy.  Secondary Diagnosis:	Diabetes mellitus  Goal:	You have Diabetes Mellitus Type II.  Secondary Diagnosis:	Hyperlipidemia  Goal:	You have high cholesterol.  Secondary Diagnosis:	Hypertension  Goal:	You have high blood pressure. Principal Discharge DX:	Coronary artery disease  Goal:	You had a cardiac catheterization.  Assessment and plan of treatment:	You have a diagnosis of coronary artery disease and  received a stent to your coronary artery.  You have been started on Aspirin 81mg daily and Plavix (Clopidogrel) 75mg daily. You MUST continue taking the daily Aspirin and Plavix to ensure your stent does not close. DO NOT STOP THESE MEDICATIONS FOR ANY REASON UNLESS OTHERWISE INDICATED BY YOUR CARDIOLOGIST BECAUSE THIS WILL PUT YOU AT RISK FOR A HEART ATTACK. You should refrain from strenuous activity and heavy lifting for 1 week. Please make a follow up appointment with your cardiologist within 1-2 weeks of your discharge. All of your prescriptions have been sent electronically to your pharmacy.  Secondary Diagnosis:	Diabetes mellitus  Goal:	You have Diabetes Mellitus Type II.  Assessment and plan of treatment:	You have a diagnosis of diabetes and should continue all of your diabetic medications as prescribed. Please do not take your Janumet XR 50mg-500mg for 2 days to prevent interaction with the contrast you received.  Secondary Diagnosis:	Hyperlipidemia  Goal:	You have high cholesterol.  Assessment and plan of treatment:	Please continue your daily statin, Rosuvastatin 20mg nightly, to ensure your cardiac stent remains open.  Secondary Diagnosis:	Hypertension  Goal:	You have high blood pressure.  Assessment and plan of treatment:	You have a history of elevated blood pressure and you should continue your blood pressure medications as prescribed. Principal Discharge DX:	Coronary artery disease  Goal:	You had a cardiac catheterization.  Assessment and plan of treatment:	You have a diagnosis of coronary artery disease and  received a stent to your coronary artery.  You have been started on Aspirin 81mg daily and Plavix (Clopidogrel) 75mg daily. You MUST continue taking the daily Aspirin and Plavix to ensure your stent does not close. DO NOT STOP THESE MEDICATIONS FOR ANY REASON UNLESS OTHERWISE INDICATED BY YOUR CARDIOLOGIST BECAUSE THIS WILL PUT YOU AT RISK FOR A HEART ATTACK. You should refrain from strenuous activity and heavy lifting for 1 week. Please make a follow up appointment with your cardiologist within 1-2 weeks of your discharge. All of your prescriptions have been sent electronically to your pharmacy.  Secondary Diagnosis:	Diabetes mellitus  Goal:	You have Diabetes Mellitus Type II.  Assessment and plan of treatment:	You have a diagnosis of diabetes and should continue all of your diabetic medications as prescribed. Please do not take your Janumet XR 50mg-500mg for 2 days to prevent interaction with the contrast you received.  Secondary Diagnosis:	Hyperlipidemia  Goal:	You have high cholesterol.  Assessment and plan of treatment:	Please continue your daily statin to ensure your cardiac stent remains open.  Secondary Diagnosis:	Hypertension  Goal:	You have high blood pressure.  Assessment and plan of treatment:	You have a history of elevated blood pressure and you should continue your blood pressure medications as prescribed. Principal Discharge DX:	Coronary artery disease  Goal:	You had a cardiac catheterization.  Assessment and plan of treatment:	You have a diagnosis of coronary artery disease and  received a stent to your coronary artery.  You have been started on Aspirin 81mg daily and Plavix (Clopidogrel) 75mg daily. You MUST continue taking the daily Aspirin and Plavix to ensure your stent does not close. DO NOT STOP THESE MEDICATIONS FOR ANY REASON UNLESS OTHERWISE INDICATED BY YOUR CARDIOLOGIST BECAUSE THIS WILL PUT YOU AT RISK FOR A HEART ATTACK. You should refrain from strenuous activity and heavy lifting for 1 week. Please make a follow up appointment with your cardiologist (Dr. Figueroa) within 1 week of your discharge. All of your prescriptions have been sent electronically to your pharmacy.  Secondary Diagnosis:	Diabetes mellitus  Goal:	You have Diabetes Mellitus Type II.  Assessment and plan of treatment:	Please do not take your Janumet XR 50mg-500mg for 2 days to prevent interaction with the contrast you received. RESUME JANUMET ON THURSDAY, 1/10/19. You can continue all other of your diabetic medications as prescribed.  Secondary Diagnosis:	Hyperlipidemia  Goal:	You have high cholesterol.  Assessment and plan of treatment:	Please continue your daily Crestor (Rosuvastatin) to ensure your cardiac stent remains open.  Secondary Diagnosis:	Hypertension  Goal:	You have high blood pressure.  Assessment and plan of treatment:	You have a history of elevated blood pressure and you started on Coreg (Carvedilol).

## 2019-01-08 NOTE — PROGRESS NOTE ADULT - SUBJECTIVE AND OBJECTIVE BOX
INTERVENTIONAL CARDIOLOGY FOLLOW UP NOTE    -Patient seen and examined this am    -No events overnight    -No complaints this am    VASCULAR ACCESS EXAM:    RADIAL:    2+ right/left radial pulse    Normal capillary refill. No evidence of motor or sensory deficits of digits, hand, wrist or forearm.    -Right radial Access site clean, non-tender, without ecchymosis or hematoma.    A/P: 73 y.o English/German speaking Female former heavy smoker with PMHx of HTN, Hyperlipidemia, IDDM, Asthma, Anxiety/depression now s/p PCI of distal RCA with HERNANDO via right radial approach with no evidence of vascular complications post procedure.    -continue with current medications including dual antiplatelet therapy    -discharge instructions as per protocol    -outpatient follow up with primary cardiologist INTERVENTIONAL CARDIOLOGY FOLLOW UP NOTE    -Patient seen and examined this am    -No events overnight    -No complaints this am    VASCULAR ACCESS EXAM:    RADIAL:    2+ right/left radial pulse    Normal capillary refill. No evidence of motor or sensory deficits of digits, hand, wrist or forearm.    -Right radial Access site clean, non-tender, without ecchymosis or hematoma.    A/P: 73 y.o English/Turkish speaking Female former heavy smoker with PMHx of HTN, Hyperlipidemia, IDDM, Asthma, Anxiety/depression now s/p PCI of proximal LAD with HERNANDO via right radial approach with no evidence of vascular complications post procedure.    -continue with current medications including dual antiplatelet therapy    -discharge instructions as per protocol    -outpatient follow up with primary cardiologist

## 2019-01-08 NOTE — DISCHARGE NOTE ADULT - MEDICATION SUMMARY - MEDICATIONS TO TAKE
I will START or STAY ON the medications listed below when I get home from the hospital:    Blood Pressure Monitor   -- Indication: For Blood Pressure    Aspirin Enteric Coated 81 mg oral delayed release tablet  -- 1 tab(s) by mouth once a day  -- Indication: For Coronary artery disease and Stent    celecoxib 200 mg oral capsule  -- 1 cap(s) by mouth 2 times a day  -- Indication: For Pain    LORazepam 1 mg oral tablet  -- 1 tab(s) by mouth 2 times a day  -- Indication: For Anxiety    gabapentin 300 mg oral capsule  -- 1 cap(s) by mouth 3 times a day  -- Indication: For Neuropathy    DULoxetine 30 mg oral delayed release capsule  -- 1 cap(s) by mouth once a day  -- Indication: For Anxiety/Depression    DULoxetine 60 mg oral delayed release capsule  -- 1 cap(s) by mouth once a day  -- Indication: For Anxiety/Depression    Trulicity Pen 1.5 mg/0.5 mL subcutaneous solution  -- Indication: For Diabetes mellitus    Tresiba FlexTouch 100 units/mL subcutaneous solution  -- 36 unit(s) subcutaneous once a day  -- Indication: For Diabetes mellitus    insulin glargine  -- 28 unit(s) subcutaneous once a day (at bedtime)  -- Indication: For Diabetes mellitus    Janumet XR 50 mg-500 mg oral tablet, extended release  -- 2 tab(s) by mouth once a day (in the evening). HOLD AND RESTART THURSDAY, 1/10/19.  -- Indication: For Diabetes mellitus - HOLD AND RESTART ON THURSDAY, 1/10/18    meclizine 12.5 mg oral tablet  -- 1 tab(s) by mouth once a day  -- Indication: For Dizziness    rosuvastatin 20 mg oral tablet  -- 1 tab(s) by mouth once a day (at bedtime)  -- Indication: For High Cholesterol    fenofibrate 160 mg oral tablet  -- 1 tab(s) by mouth once a day  -- Indication: For High Cholesterol    Plavix 75 mg oral tablet  -- 1 tab(s) by mouth once a day  -- Indication: For Coronary artery disease and Stent    ARIPiprazole 10 mg oral tablet  -- 1 tab(s) by mouth once a day  -- Indication: For Depression    carvedilol 6.25 mg oral tablet  -- 1 tab(s) by mouth every 12 hours  -- Indication: For High Blood Pressure    Symbicort 160 mcg-4.5 mcg/inh inhalation aerosol  -- 2 puff(s) inhaled 2 times a day  -- Indication: For Asthma    ProAir HFA 90 mcg/inh inhalation aerosol  -- 2 puff(s) inhaled 4 times a day  -- Indication: For Asthma    Linzess 145 mcg oral capsule  -- 1 cap(s) by mouth once a day  -- Indication: For Irritable Bowel Syndrome    Amitiza 24 mcg oral capsule  -- 1 cap(s) by mouth 2 times a day  -- Indication: For Irritable Bowel Syndrome    montelukast 10 mg oral tablet  -- 1 tab(s) by mouth once a day  -- Indication: For Asthma    esomeprazole 40 mg oral delayed release capsule  -- 1 cap(s) by mouth once a day  -- Indication: For Heartburn    Multilex oral tablet  -- 1 tab(s) by mouth once a day  -- Indication: For Daily Vitamin

## 2019-01-10 DIAGNOSIS — E11.40 TYPE 2 DIABETES MELLITUS WITH DIABETIC NEUROPATHY, UNSPECIFIED: ICD-10-CM

## 2019-01-10 DIAGNOSIS — F32.9 MAJOR DEPRESSIVE DISORDER, SINGLE EPISODE, UNSPECIFIED: ICD-10-CM

## 2019-01-10 DIAGNOSIS — F41.9 ANXIETY DISORDER, UNSPECIFIED: ICD-10-CM

## 2019-01-10 DIAGNOSIS — E78.5 HYPERLIPIDEMIA, UNSPECIFIED: ICD-10-CM

## 2019-01-10 DIAGNOSIS — I25.110 ATHEROSCLEROTIC HEART DISEASE OF NATIVE CORONARY ARTERY WITH UNSTABLE ANGINA PECTORIS: ICD-10-CM

## 2019-01-10 DIAGNOSIS — G62.9 POLYNEUROPATHY, UNSPECIFIED: ICD-10-CM

## 2019-01-10 DIAGNOSIS — I10 ESSENTIAL (PRIMARY) HYPERTENSION: ICD-10-CM

## 2019-01-10 DIAGNOSIS — I25.10 ATHEROSCLEROTIC HEART DISEASE OF NATIVE CORONARY ARTERY WITHOUT ANGINA PECTORIS: ICD-10-CM

## 2019-01-10 DIAGNOSIS — K58.9 IRRITABLE BOWEL SYNDROME WITHOUT DIARRHEA: ICD-10-CM

## 2019-01-10 RX ORDER — SITAGLIPTIN AND METFORMIN HYDROCHLORIDE 500; 50 MG/1; MG/1
2 TABLET, FILM COATED ORAL
Qty: 0 | Refills: 0 | COMMUNITY
Start: 2019-01-10

## 2019-02-17 ENCOUNTER — APPOINTMENT (OUTPATIENT)
Dept: HEART AND VASCULAR | Facility: CLINIC | Age: 74
End: 2019-02-17
Payer: MEDICARE

## 2019-02-17 PROCEDURE — 93228 REMOTE 30 DAY ECG REV/REPORT: CPT

## 2019-02-27 PROBLEM — I25.10 ATHEROSCLEROTIC HEART DISEASE OF NATIVE CORONARY ARTERY WITHOUT ANGINA PECTORIS: Chronic | Status: ACTIVE | Noted: 2019-01-03

## 2019-02-27 PROBLEM — F32.9 MAJOR DEPRESSIVE DISORDER, SINGLE EPISODE, UNSPECIFIED: Chronic | Status: ACTIVE | Noted: 2019-01-03

## 2019-02-27 PROBLEM — J45.909 UNSPECIFIED ASTHMA, UNCOMPLICATED: Chronic | Status: ACTIVE | Noted: 2019-01-03

## 2019-02-27 PROBLEM — E78.5 HYPERLIPIDEMIA, UNSPECIFIED: Chronic | Status: ACTIVE | Noted: 2019-01-03

## 2019-02-27 PROBLEM — E11.9 TYPE 2 DIABETES MELLITUS WITHOUT COMPLICATIONS: Chronic | Status: ACTIVE | Noted: 2019-01-03

## 2019-02-27 PROBLEM — F41.9 ANXIETY DISORDER, UNSPECIFIED: Chronic | Status: ACTIVE | Noted: 2019-01-03

## 2019-02-27 PROBLEM — I10 ESSENTIAL (PRIMARY) HYPERTENSION: Chronic | Status: ACTIVE | Noted: 2019-01-03

## 2019-11-13 RX ORDER — CHLORHEXIDINE GLUCONATE 213 G/1000ML
1 SOLUTION TOPICAL ONCE
Refills: 0 | Status: DISCONTINUED | OUTPATIENT
Start: 2019-11-14 | End: 2019-11-30

## 2019-11-13 NOTE — H&P ADULT - NSICDXPASTMEDICALHX_GEN_ALL_CORE_FT
PAST MEDICAL HISTORY:  Anxiety     Asthma     Coronary artery disease     Depression     Diabetes mellitus     Gastritis     Hyperlipidemia     Hypertension PAST MEDICAL HISTORY:  Anxiety     Asthma     Coronary artery disease     Depression     Diabetes mellitus     Gastritis     Hyperlipidemia     Hypertension     OA (osteoarthritis)     Osteoporosis

## 2019-11-13 NOTE — H&P ADULT - NSICDXPASTSURGICALHX_GEN_ALL_CORE_FT
PAST SURGICAL HISTORY:  H/O cataract extraction     H/O foot surgery     H/O hernia repair     S/P JAH-BSO

## 2019-11-13 NOTE — H&P ADULT - HISTORY OF PRESENT ILLNESS
SKELETON    75 yo English/Gambian speaking Female, former heavy smoker, with PMHx of uncontrolled HTN, Hyperlipidemia, IDDM, Asthma and Anxiety/depression, h/o CAD HERNANDO pLAD ...    who presented with CCS Anginal Class 3 Equivalent symptoms and an abnormal Stress test. Pt s/p cardiac angiogram (1/7/19) with HERNANDO to the proximal LAD by right radial access. LVEF 55% and EDP 15. Post procedure, pt had chest pain, elevated SBP and was given IV Fentanyl and IV Hydralazine. Repeat EKG was with non-ischemic changes. CP has completely resolved. Pt was admitted as inpatient on 5URIS under Dr. Figueroa for monitoring and telemetry. Overnight, pt had a few runs of sinus tachycardia with HR in the 110’s. Pt is afebrile, asymptomatic, denies infectious symptoms, O2 sat stable 98% on RA and currently HR is in the 90’s. This AM, VS remained stable with current 's-140's/70's. Pt is asymptomatic, right wrist stable, labs and EKG reviewed. As discussed with Dr. Figueroa, start Coreg 6.25 mg BID and continue ASA 81mg daily, Plavix 75mg daily and Crestor 20mg nightly. Prescribed a BP monitor for home monitoring. Pt stable for D/C home as per Dr. Figueroa. See Dr. Figueroa in 1 week ***Hx obtained via  # [id number]***    73 y/o Wolof speaking Female, former smoker, with PMHx of uncontrolled HTN, Hyperlipidemia, IDDM, Asthma, Anxiety/depression, known CAD with PCI/HERNANDO of pLAD on 1/7/19 @ [facility], s/p diagnostic cath on ?10/11/19 @ [place] revealing non-obstructive CAD, who presented to cardiologist with c/o substernal chest pain associated with palpitations occurring independent of any type of activity, relieved spontaneously, x 2 weeks.  Pt underwent a NST on 8/23/19 which revealed moderate reversible inferior wall ischemia, EF 63%.      In light of pt's risk factors, known CAD, CCS class III-IV anginal symptoms, and abnormal NST, she is recommended for cardiac cath with possible intervention for suspected CAD progression.    CATH 1/7/19 @ [facility]: EF 55%, LM patent, pLAD 95, mid Cx <20, mid RCA<20, Successful PCI/HERNANDO pLAD. ***Hx obtained via  # [id number]***    73 y/o Turkish speaking Female, former smoker, with PMHx of uncontrolled HTN, Hyperlipidemia, IDDM, Asthma, Anxiety/depression, known CAD with PCI/HERNANDO of pLAD on 1/7/19 @ [facility], s/p diagnostic cath on 9/11/19 @ [place] revealing non-obstructive CAD, who presented to cardiologist with c/o substernal chest pain associated with palpitations occurring independent of any type of activity, relieved spontaneously, x 2 weeks.  Pt underwent a NST on 8/23/19 which revealed moderate reversible inferior wall ischemia, EF 63%.      In light of pt's risk factors, known CAD, CCS class III-IV anginal symptoms, and abnormal NST, she is recommended for cardiac cath with possible intervention for suspected CAD progression.    OF NOTE: Case reviewed on arrival with Dr. Hernández and Dr. Figueroa; in the setting of recent diagnostic cardiac catheterization at [facility] 9/11/2019, cardiac catheterization canceled today with plan for medical management at this time. Imdur 30 mg daily and Ranexa 500 mg orally BID added to regimen. Patient will follow up with Dr. Figueroa in the office tomorrow.

## 2019-11-13 NOTE — H&P ADULT - ATTENDING COMMENTS
Assessment: Patient personally seen and examined myself during rounds with the   Physician Assistant  ON DATE 11/14/19  Note read, including vitals, physical findings, laboratory data, and radiological reports.   Agree with above with revisions, if any included below.   - As Above  - Plan of care: continuous telemetry monitoring, frequent hemodynamic checks, daily weights, I/Os, daily 12 Lead ECG, add K and Mg to labs, cycle troponin to peak, consider Adv HF / EP / CTS / Interv. Cardio consultation if intervention is needed.

## 2019-11-14 ENCOUNTER — OUTPATIENT (OUTPATIENT)
Dept: OUTPATIENT SERVICES | Facility: HOSPITAL | Age: 74
LOS: 1 days | End: 2019-11-14
Payer: MEDICARE

## 2019-11-14 DIAGNOSIS — Z98.890 OTHER SPECIFIED POSTPROCEDURAL STATES: Chronic | ICD-10-CM

## 2019-11-14 DIAGNOSIS — Z90.710 ACQUIRED ABSENCE OF BOTH CERVIX AND UTERUS: Chronic | ICD-10-CM

## 2019-11-14 DIAGNOSIS — Z98.49 CATARACT EXTRACTION STATUS, UNSPECIFIED EYE: Chronic | ICD-10-CM

## 2019-11-14 LAB
ALBUMIN SERPL ELPH-MCNC: 4.6 G/DL — SIGNIFICANT CHANGE UP (ref 3.3–5)
ALP SERPL-CCNC: 88 U/L — SIGNIFICANT CHANGE UP (ref 40–120)
ALT FLD-CCNC: 23 U/L — SIGNIFICANT CHANGE UP (ref 10–45)
ANION GAP SERPL CALC-SCNC: 12 MMOL/L — SIGNIFICANT CHANGE UP (ref 5–17)
APTT BLD: 37.3 SEC — HIGH (ref 27.5–36.3)
AST SERPL-CCNC: 24 U/L — SIGNIFICANT CHANGE UP (ref 10–40)
BASOPHILS # BLD AUTO: 0.07 K/UL — SIGNIFICANT CHANGE UP (ref 0–0.2)
BASOPHILS NFR BLD AUTO: 1 % — SIGNIFICANT CHANGE UP (ref 0–2)
BILIRUB SERPL-MCNC: 0.2 MG/DL — SIGNIFICANT CHANGE UP (ref 0.2–1.2)
BUN SERPL-MCNC: 22 MG/DL — SIGNIFICANT CHANGE UP (ref 7–23)
CALCIUM SERPL-MCNC: 10 MG/DL — SIGNIFICANT CHANGE UP (ref 8.4–10.5)
CHLORIDE SERPL-SCNC: 106 MMOL/L — SIGNIFICANT CHANGE UP (ref 96–108)
CHOLEST SERPL-MCNC: 154 MG/DL — SIGNIFICANT CHANGE UP (ref 10–199)
CK MB CFR SERPL CALC: 2.5 NG/ML — SIGNIFICANT CHANGE UP (ref 0–6.7)
CK SERPL-CCNC: 97 U/L — SIGNIFICANT CHANGE UP (ref 25–170)
CO2 SERPL-SCNC: 26 MMOL/L — SIGNIFICANT CHANGE UP (ref 22–31)
CREAT SERPL-MCNC: 0.78 MG/DL — SIGNIFICANT CHANGE UP (ref 0.5–1.3)
EOSINOPHIL # BLD AUTO: 0.21 K/UL — SIGNIFICANT CHANGE UP (ref 0–0.5)
EOSINOPHIL NFR BLD AUTO: 2.9 % — SIGNIFICANT CHANGE UP (ref 0–6)
ERYTHROCYTE [SEDIMENTATION RATE] IN BLOOD: 12 MM/HR — SIGNIFICANT CHANGE UP
GLUCOSE BLDC GLUCOMTR-MCNC: 98 MG/DL — SIGNIFICANT CHANGE UP (ref 70–99)
GLUCOSE SERPL-MCNC: 118 MG/DL — HIGH (ref 70–99)
HBA1C BLD-MCNC: 8.2 % — HIGH (ref 4–5.6)
HCT VFR BLD CALC: 37.9 % — SIGNIFICANT CHANGE UP (ref 34.5–45)
HDLC SERPL-MCNC: 45 MG/DL — LOW
HGB BLD-MCNC: 12.2 G/DL — SIGNIFICANT CHANGE UP (ref 11.5–15.5)
IMM GRANULOCYTES NFR BLD AUTO: 0.1 % — SIGNIFICANT CHANGE UP (ref 0–1.5)
INR BLD: 1.09 — SIGNIFICANT CHANGE UP (ref 0.88–1.16)
LIPID PNL WITH DIRECT LDL SERPL: 82 MG/DL — SIGNIFICANT CHANGE UP
LYMPHOCYTES # BLD AUTO: 2.09 K/UL — SIGNIFICANT CHANGE UP (ref 1–3.3)
LYMPHOCYTES # BLD AUTO: 29 % — SIGNIFICANT CHANGE UP (ref 13–44)
MCHC RBC-ENTMCNC: 28.8 PG — SIGNIFICANT CHANGE UP (ref 27–34)
MCHC RBC-ENTMCNC: 32.2 GM/DL — SIGNIFICANT CHANGE UP (ref 32–36)
MCV RBC AUTO: 89.4 FL — SIGNIFICANT CHANGE UP (ref 80–100)
MONOCYTES # BLD AUTO: 0.44 K/UL — SIGNIFICANT CHANGE UP (ref 0–0.9)
MONOCYTES NFR BLD AUTO: 6.1 % — SIGNIFICANT CHANGE UP (ref 2–14)
NEUTROPHILS # BLD AUTO: 4.39 K/UL — SIGNIFICANT CHANGE UP (ref 1.8–7.4)
NEUTROPHILS NFR BLD AUTO: 60.9 % — SIGNIFICANT CHANGE UP (ref 43–77)
NRBC # BLD: 0 /100 WBCS — SIGNIFICANT CHANGE UP (ref 0–0)
PLATELET # BLD AUTO: 223 K/UL — SIGNIFICANT CHANGE UP (ref 150–400)
POTASSIUM SERPL-MCNC: 4.5 MMOL/L — SIGNIFICANT CHANGE UP (ref 3.5–5.3)
POTASSIUM SERPL-SCNC: 4.5 MMOL/L — SIGNIFICANT CHANGE UP (ref 3.5–5.3)
PROT SERPL-MCNC: 7.2 G/DL — SIGNIFICANT CHANGE UP (ref 6–8.3)
PROTHROM AB SERPL-ACNC: 12.4 SEC — SIGNIFICANT CHANGE UP (ref 10–12.9)
RBC # BLD: 4.24 M/UL — SIGNIFICANT CHANGE UP (ref 3.8–5.2)
RBC # FLD: 12.8 % — SIGNIFICANT CHANGE UP (ref 10.3–14.5)
SODIUM SERPL-SCNC: 144 MMOL/L — SIGNIFICANT CHANGE UP (ref 135–145)
TOTAL CHOLESTEROL/HDL RATIO MEASUREMENT: 3.4 RATIO — SIGNIFICANT CHANGE UP (ref 3.3–7.1)
TRIGL SERPL-MCNC: 137 MG/DL — SIGNIFICANT CHANGE UP (ref 10–149)
WBC # BLD: 7.21 K/UL — SIGNIFICANT CHANGE UP (ref 3.8–10.5)
WBC # FLD AUTO: 7.21 K/UL — SIGNIFICANT CHANGE UP (ref 3.8–10.5)

## 2019-11-14 PROCEDURE — 93010 ELECTROCARDIOGRAM REPORT: CPT

## 2019-11-14 PROCEDURE — ZZZZZ: CPT

## 2019-11-14 PROCEDURE — 82550 ASSAY OF CK (CPK): CPT

## 2019-11-14 PROCEDURE — 85652 RBC SED RATE AUTOMATED: CPT

## 2019-11-14 PROCEDURE — 82248 BILIRUBIN DIRECT: CPT

## 2019-11-14 PROCEDURE — 85730 THROMBOPLASTIN TIME PARTIAL: CPT

## 2019-11-14 PROCEDURE — 83036 HEMOGLOBIN GLYCOSYLATED A1C: CPT

## 2019-11-14 PROCEDURE — 82553 CREATINE MB FRACTION: CPT

## 2019-11-14 PROCEDURE — 85025 COMPLETE CBC W/AUTO DIFF WBC: CPT

## 2019-11-14 PROCEDURE — 93005 ELECTROCARDIOGRAM TRACING: CPT

## 2019-11-14 PROCEDURE — 80053 COMPREHEN METABOLIC PANEL: CPT

## 2019-11-14 PROCEDURE — 82962 GLUCOSE BLOOD TEST: CPT

## 2019-11-14 PROCEDURE — 80061 LIPID PANEL: CPT

## 2019-11-14 PROCEDURE — 85610 PROTHROMBIN TIME: CPT

## 2019-11-14 RX ORDER — ISOSORBIDE MONONITRATE 60 MG/1
1 TABLET, EXTENDED RELEASE ORAL
Qty: 30 | Refills: 1
Start: 2019-11-14 | End: 2020-01-12

## 2019-11-14 RX ORDER — RANOLAZINE 500 MG/1
1 TABLET, FILM COATED, EXTENDED RELEASE ORAL
Qty: 60 | Refills: 1
Start: 2019-11-14 | End: 2020-01-12

## 2019-11-21 DIAGNOSIS — I25.10 ATHEROSCLEROTIC HEART DISEASE OF NATIVE CORONARY ARTERY WITHOUT ANGINA PECTORIS: ICD-10-CM

## 2022-06-29 PROBLEM — M81.0 AGE-RELATED OSTEOPOROSIS WITHOUT CURRENT PATHOLOGICAL FRACTURE: Chronic | Status: ACTIVE | Noted: 2019-11-13

## 2022-06-29 PROBLEM — M19.90 UNSPECIFIED OSTEOARTHRITIS, UNSPECIFIED SITE: Chronic | Status: ACTIVE | Noted: 2019-11-13

## 2022-06-29 PROBLEM — K29.70 GASTRITIS, UNSPECIFIED, WITHOUT BLEEDING: Chronic | Status: ACTIVE | Noted: 2019-11-13

## 2022-07-21 ENCOUNTER — APPOINTMENT (OUTPATIENT)
Dept: HEART AND VASCULAR | Facility: CLINIC | Age: 77
End: 2022-07-21

## 2022-07-21 ENCOUNTER — NON-APPOINTMENT (OUTPATIENT)
Age: 77
End: 2022-07-21

## 2022-07-21 VITALS
BODY MASS INDEX: 27 KG/M2 | TEMPERATURE: 95.8 F | DIASTOLIC BLOOD PRESSURE: 65 MMHG | SYSTOLIC BLOOD PRESSURE: 140 MMHG | WEIGHT: 143 LBS | OXYGEN SATURATION: 93 % | HEART RATE: 69 BPM | HEIGHT: 61 IN

## 2022-07-21 DIAGNOSIS — R00.2 PALPITATIONS: ICD-10-CM

## 2022-07-21 DIAGNOSIS — I25.10 ATHEROSCLEROTIC HEART DISEASE OF NATIVE CORONARY ARTERY W/OUT ANGINA PECTORIS: ICD-10-CM

## 2022-07-21 PROCEDURE — 93000 ELECTROCARDIOGRAM COMPLETE: CPT

## 2022-07-21 PROCEDURE — 99214 OFFICE O/P EST MOD 30 MIN: CPT | Mod: 25

## 2022-08-25 ENCOUNTER — APPOINTMENT (OUTPATIENT)
Dept: HEART AND VASCULAR | Facility: CLINIC | Age: 77
End: 2022-08-25

## 2022-08-25 VITALS
WEIGHT: 140 LBS | OXYGEN SATURATION: 96 % | BODY MASS INDEX: 26.43 KG/M2 | HEART RATE: 72 BPM | HEIGHT: 61 IN | DIASTOLIC BLOOD PRESSURE: 69 MMHG | TEMPERATURE: 97.7 F | SYSTOLIC BLOOD PRESSURE: 110 MMHG

## 2022-09-28 ENCOUNTER — FORM ENCOUNTER (OUTPATIENT)
Age: 77
End: 2022-09-28

## 2022-09-29 ENCOUNTER — OUTPATIENT (OUTPATIENT)
Dept: OUTPATIENT SERVICES | Facility: HOSPITAL | Age: 77
LOS: 1 days | End: 2022-09-29
Payer: MEDICARE

## 2022-09-29 DIAGNOSIS — Z98.890 OTHER SPECIFIED POSTPROCEDURAL STATES: Chronic | ICD-10-CM

## 2022-09-29 DIAGNOSIS — I25.10 ATHEROSCLEROTIC HEART DISEASE OF NATIVE CORONARY ARTERY WITHOUT ANGINA PECTORIS: ICD-10-CM

## 2022-09-29 DIAGNOSIS — Z98.49 CATARACT EXTRACTION STATUS, UNSPECIFIED EYE: Chronic | ICD-10-CM

## 2022-09-29 DIAGNOSIS — Z90.710 ACQUIRED ABSENCE OF BOTH CERVIX AND UTERUS: Chronic | ICD-10-CM

## 2022-09-29 PROCEDURE — 93306 TTE W/DOPPLER COMPLETE: CPT | Mod: 26

## 2022-09-29 PROCEDURE — 93306 TTE W/DOPPLER COMPLETE: CPT

## 2022-11-09 ENCOUNTER — APPOINTMENT (OUTPATIENT)
Dept: GASTROENTEROLOGY | Facility: CLINIC | Age: 77
End: 2022-11-09

## 2024-04-08 NOTE — H&P ADULT - AS BP NONINV METHOD
[] [facility]  Outpatient Rehabilitation &  Therapy  [address]  [phone]  [phone] [x] [facility]  Outpatient Rehabilitation &  Therapy  [address]  P: [phone]  F: [phone] [] [facility]  Outpatient Rehabilitation &  Therapy  [address]  P: [phone]  F: [phone] [] [facility]  Outpatient Rehabilitation &  Therapy  [address]  [phone]  [phone] [] [facility]  Outpatient Rehabilitation &  Therapy  [address]   P: [phone]  F: [phone]  [] [facility]  Outpatient Rehabilitation &  Therapy  [address]  P: [phone]  F: [phone] [] [facility]  Outpatient Rehabilitation &  Therapy  [address]  P: [phone]  F: [phone] [] [facility]  Outpatient Rehabilitation &  Therapy  [address]  P: [phone]  F: [phone] [] [facility]  Outpatient Rehabilitation &  Therapy  [address]  P: [phone]  F: [phone]  [] [facility] Outpatient Rehabilitation &  Therapy  [address]  P: [phone]  F: [phone]     Therapy Cancel/No Show note    Date: 2024  Patient: Greg Flores  : 1940  MRN: [id number]    Cancels/No Shows to date:     For today's appointment patient:    [x]  Cancelled    [] Rescheduled appointment    [] No-show     Reason given by patient:    []  Patient ill    []  Conflicting appointment    [] No transportation      [] Conflict with work    [] No reason given    [] Weather related    [] COVID-19    [x] Other:      Comments:  out of town       [x] Next appointment was confirmed    Electronically signed by: Patel Purcell, PTA       electronic

## 2025-07-09 NOTE — H&P ADULT - RESPIRATORY
----- Message from Celso Friedman PA-C sent at 7/9/2025  8:54 AM CDT -----  Call patient. She has mild hypokalemia from GI loses. A potassium supplement was sent to her pharmacy.    _________________________    Called pt and let her know of above message. Pt verbalized understanding, had no further questions.    Breath Sounds equal & clear to percussion & auscultation, no accessory muscle use